# Patient Record
Sex: FEMALE | URBAN - METROPOLITAN AREA
[De-identification: names, ages, dates, MRNs, and addresses within clinical notes are randomized per-mention and may not be internally consistent; named-entity substitution may affect disease eponyms.]

---

## 2019-04-11 ENCOUNTER — TELEPHONE (OUTPATIENT)
Dept: INFUSION THERAPY | Age: 84
End: 2019-04-11

## 2019-06-26 ENCOUNTER — TELEPHONE (OUTPATIENT)
Dept: INFUSION THERAPY | Age: 84
End: 2019-06-26

## 2019-06-26 NOTE — TELEPHONE ENCOUNTER
SPOKE WITH PT, SHE STATED SHE DOESN'T COME TO [facility]. THAT SHE GOES TO DR. Verenice Bazan OFFICE. ALSO STATED SHE RECEIVED PROLIA IN MARCH OF THIS YEAR. I CALLED DR CHANG'S OFFICE AND RELAYED THE INFORMATION FROM THE PT. THEY STATED SHE IS DUE AND HAS CANCELLED HER LAST FEW APPTS.   OFFICE IS GOING TO FOLLOW UP WITH THE PT.